# Patient Record
Sex: MALE | Race: WHITE | Employment: FULL TIME | URBAN - METROPOLITAN AREA
[De-identification: names, ages, dates, MRNs, and addresses within clinical notes are randomized per-mention and may not be internally consistent; named-entity substitution may affect disease eponyms.]

---

## 2019-05-24 ENCOUNTER — HOSPITAL ENCOUNTER (EMERGENCY)
Age: 44
Discharge: HOME | End: 2019-05-24
Attending: EMERGENCY MEDICINE

## 2019-05-24 VITALS
HEART RATE: 82 BPM | WEIGHT: 300 LBS | DIASTOLIC BLOOD PRESSURE: 82 MMHG | RESPIRATION RATE: 16 BRPM | TEMPERATURE: 98.2 F | OXYGEN SATURATION: 95 % | HEIGHT: 74 IN | SYSTOLIC BLOOD PRESSURE: 128 MMHG | BODY MASS INDEX: 38.5 KG/M2

## 2019-05-24 DIAGNOSIS — T40.411A ACCIDENTAL FENTANYL OVERDOSE, INITIAL ENCOUNTER (HCC): ICD-10-CM

## 2019-05-24 DIAGNOSIS — F11.10 OPIOID ABUSE (HCC): Primary | ICD-10-CM

## 2019-05-24 PROCEDURE — 99283 EMERGENCY DEPT VISIT LOW MDM: CPT

## 2019-05-24 RX ORDER — NALOXONE HYDROCHLORIDE 1 MG/ML
0.4 INJECTION INTRAMUSCULAR; INTRAVENOUS; SUBCUTANEOUS PRN
Status: DISCONTINUED | OUTPATIENT
Start: 2019-05-24 | End: 2019-05-25 | Stop reason: HOSPADM

## 2019-05-24 NOTE — ED PROVIDER NOTES
EMERGENCY DEPARTMENT ENCOUNTER    Pt Name: Tal Florentino  MRN: [id number]  [place] 1975  Date of evaluation: 5/24/19  CHIEF COMPLAINT       Chief Complaint   Patient presents with    Addiction Problem     Daily Heroin use    Withdrawal     Heroin     HISTORY OF PRESENT ILLNESS   HPI   The patient is a 78-year-old white male who was brought to the emergency room status post his children calling 911 status post the patient snorting fentanyl. Patient states he used between a half a gram, and a gram.  The patient states he uses heroin on a daily basis. A short was brought into the emergency room along with his wife. The wife reportedly told her attending physician that she was withdrawing. The  states he was snorting the drug. The patient is not suicidal, or homicidal.  The patient is uncooperative, but not violent. He denies any other drug use at this time. REVIEW OF SYSTEMS     Review of Systems   The patient is uncooperative in a review of systems cannot be performed. PASTMEDICAL HISTORY     Past Medical History:   Diagnosis Date    Hepatitis C     PTSD (post-traumatic stress disorder)     Snores      SURGICAL HISTORY       Past Surgical History:   Procedure Laterality Date    INGUINAL HERNIA REPAIR Left     LEG SURGERY Left     compartment syndrome left leg     CURRENT MEDICATIONS       Previous Medications    CLONAZEPAM (KLONOPIN) 1 MG TABLET    Take 1 mg by mouth 2 times daily    IBUPROFEN (ADVIL;MOTRIN) 800 MG TABLET    Take 1 tablet by mouth every 8 hours as needed for Pain. NEOMYCIN-POLYMYXIN-DEXAMETH 0.1 % OINT    Apply 0.5 inches to eye 4 times daily    OXYCODONE-ACETAMINOPHEN (PERCOCET) 5-325 MG PER TABLET    Take 1-2 tablets by mouth every 6 hours as needed for Pain    RISPERIDONE (RISPERDAL) 1 MG TABLET    Take 1 mg by mouth nightly    SERTRALINE (ZOLOFT) 25 MG TABLET    Take 25 mg by mouth daily     ALLERGIES     is allergic to prednisone.   FAMILY HISTORY     has no family status information on file. SOCIAL HISTORY       Social History     Tobacco Use    Smoking status: Former Smoker   Substance Use Topics    Alcohol use: No     Alcohol/week: 0.0 oz    Drug use: Yes     Types: Marijuana     Comment: history of heroin, clean 1 years     PHYSICAL EXAM     INITIAL VITALS: /82   Pulse 82   Temp 98.2 °F (36.8 °C) (Oral)   Resp 16   Ht 6' 2\" (1.88 m)   Wt 300 lb (136.1 kg)   SpO2 95%   BMI 38.52 kg/m²    Physical Exam  Gen. well-developed well-nourished white male in no acute distress  Vital signs as per above  Neck supple without JVD thyromegaly or lymphadenopathy  Chest clear to auscultation  Cor regular regular rhythm normal S1 normal S2 without appreciable murmurs or gallops  Abdomen soft positive bowel sounds without organomegaly tenderness or palpable masses  Extremities without clubbing cyanosis or edema  Neuro grossly intact and nonfocal.  MEDICAL DECISION MAKING:   The patient admits to fentanyll ingestion. CRITICAL CARE:   None    PROCEDURES:    Procedures    DIAGNOSTIC RESULTS   EKG:All EKG's are interpreted by the Emergency Department Physician who either signs or Co-signs this chart in the absence of a cardiologist.        RADIOLOGY:All plain film, CT, MRI, and formal ultrasound images (except ED bedside ultrasound) are read by the radiologist, see reports below, unless otherwisenoted in MDM or here. No orders to display     LABS: All lab results were reviewed by myself, and all abnormals are listed below. Labs Reviewed - No data to display    EMERGENCY DEPARTMENTCOURSE:   When told to wake up her we would give him Narcan the patient would become minimally cooperative, see states I will get really sick if you give me Narcan. When the patient is sober enough for discharge he can be sent home.       Vitals:    Vitals:    05/24/19 1903   BP: 128/82   Pulse: 82   Resp: 16   Temp: 98.2 °F (36.8 °C)   TempSrc: Oral   SpO2: 95%   Weight: 300 lb (136.1 kg)   Height: 6' 2\" (1.88 m)       The patient was given the following medications while in the emergency department:  Orders Placed This Encounter   Medications    naloxone (NARCAN) injection 0.4 mg     CONSULTS:  None    FINAL IMPRESSION      1. Opioid abuse ([facility] [address]    2. Accidental fentanyl overdose, initial encounter [facility])          DISPOSITION/PLAN   DISPOSITION Decision To Discharge 05/24/2019 08:47:23 PM      PATIENT REFERRED TO:  No follow-up provider specified.   DISCHARGE MEDICATIONS:  New Prescriptions    No medications on file     Kiran Luu MD  Attending Emergency Physician                    Helder Woodward MD  05/24/19 [id number]

## 2019-05-24 NOTE — ED NOTES
Pt states he was at home with his wife and kids today when he used heroin. Pt states his daughter called 911. Pt states he is a daily heroin user. Pt states he is going through withdrawal now because he hasn't used in about 5.5 hours. Pt is restless but cooperative.       Lupe Logan RN  05/24/19 [id number]

## 2019-05-25 NOTE — ED NOTES
Provisional Diagnosis:    Opioid Use Disorder, Severe,     Psychosocial and Contextual Factors:  Patient is not linked to a mental health agency. Patient is unsure if he has insurance. C-SSRS Summary:   Patient: X  Family:   Agency: X(EPIC)     Present Suicidal Behavior:    Verbal: Patient denies   Attempt: Patient denies      Past Suicidal Behavior:   Verbal:  Patient denies   Attempt: Patient denies    Self-Injurious/Self-Mutilation: Patient denies     Trauma Identified:  Per EPIC: Patient has a history of PTSD    Protective Factors: Patient reports that he is employed. Risk Factors: Patient as severe substance use disorder. Substance Abuse: Patient reports that he snorts ½ gram of Fentanyl daily    Clinical Summary:  Patient is a 64-year-old  male who came to the [facility] on voluntary status by ambulance. Patient reports that he is here because I could not find anything to get better.  Patient reports that his 12year-old child called the ambulance because he was withdrawing. Patient denies visual and auditory hallucinations. Patient denies suicidal ideation. Patient denies homicidal ideation. Level of Care Disposition: Writer consult with Dr. Camille Suarez. Patient to be discharged with outpatient and inpatient area resources for substance use concerns.

## 2021-09-06 ENCOUNTER — HOSPITAL ENCOUNTER (EMERGENCY)
Age: 46
Discharge: LEFT BEFORE DISCHARGE | End: 2021-09-06
Attending: EMERGENCY MEDICINE

## 2021-09-06 VITALS
TEMPERATURE: 98 F | HEART RATE: 69 BPM | WEIGHT: 275 LBS | DIASTOLIC BLOOD PRESSURE: 83 MMHG | HEIGHT: 74 IN | RESPIRATION RATE: 20 BRPM | SYSTOLIC BLOOD PRESSURE: 123 MMHG | BODY MASS INDEX: 35.29 KG/M2 | OXYGEN SATURATION: 97 %

## 2021-09-06 DIAGNOSIS — R21 RASH: Primary | ICD-10-CM

## 2021-09-06 PROCEDURE — 99283 EMERGENCY DEPT VISIT LOW MDM: CPT

## 2021-09-06 RX ORDER — DIPHENHYDRAMINE HCL 25 MG
25 TABLET ORAL ONCE
Status: DISCONTINUED | OUTPATIENT
Start: 2021-09-06 | End: 2021-09-07 | Stop reason: HOSPADM

## 2021-09-06 ASSESSMENT — PAIN DESCRIPTION - DESCRIPTORS
DESCRIPTORS: PINS AND NEEDLES
DESCRIPTORS: ACHING;ITCHING

## 2021-09-06 ASSESSMENT — ENCOUNTER SYMPTOMS
NAUSEA: 0
SORE THROAT: 0
SHORTNESS OF BREATH: 0
COUGH: 0
RHINORRHEA: 0
ABDOMINAL PAIN: 0
CONSTIPATION: 0
DIARRHEA: 0
VOMITING: 0

## 2021-09-06 ASSESSMENT — PAIN SCALES - GENERAL
PAINLEVEL_OUTOF10: 10
PAINLEVEL_OUTOF10: 7

## 2021-09-06 ASSESSMENT — PAIN DESCRIPTION - FREQUENCY
FREQUENCY: CONTINUOUS
FREQUENCY: CONTINUOUS

## 2021-09-06 ASSESSMENT — PAIN DESCRIPTION - LOCATION: LOCATION: GENERALIZED

## 2021-09-07 NOTE — ED PROVIDER NOTES
[address]  ED  Emergency Department Encounter  EmergencyMedicine Resident     Pt Name:Demetrius Pfeiffer  MRN: [id number]  [place] 1975  Date of evaluation: 9/6/21  PCP:  No primary care provider on file. This patient was evaluated in the Emergency Department for symptoms described in the history of present illness. The patient was evaluated in the context of the global COVID-19 pandemic, which necessitated consideration that the patient might be at risk for infection with the SARS-CoV-2 virus that causes COVID-19. Institutional protocols and algorithms that pertain to the evaluation of patients at risk for COVID-19 are in a state of rapid change based on information released by regulatory bodies including the CDC and federal and state organizations. These policies and algorithms were followed during the patient's care in the ED. CHIEF COMPLAINT       Chief Complaint   Patient presents with    Rash     pt states that a month ago he came incontact with something while working on a [address] and has this problem with itching of skin causing sores       HISTORY OF PRESENT ILLNESS  (Location/Symptom, Timing/Onset, Context/Setting, Quality, Duration, Modifying Factors, Severity.)      Ty Palumbo is a 50 y.o. male who presents with rash. Patient presents to the emergency department with months of rash that is pruritic, involving bilateral upper and lower extremities, patient reports that bed bugs have been coming out of his skin, his mouth and his anus. Patient reports that the bugs are swimming throughout his body, he is accompanied by his wife who reports that nobody else in the house is affected by these \"bedbugs. \"  Patient denies any fevers, chills, cough, congestion, chest pain, shortness breath, abdominal pain, nausea, vomiting, diarrhea, suicidal ideation, homicidal ideation, hallucinations.   Patient was diagnosed with prurigo nodularis, prescribed medications which helped temporarily. PAST MEDICAL / SURGICAL / SOCIAL / FAMILY HISTORY      has a past medical history of Hepatitis C, PTSD (post-traumatic stress disorder), and Snores. has a past surgical history that includes Inguinal hernia repair (Left) and Leg Surgery (Left). Social History     Socioeconomic History    Marital status: Single     Spouse name: Not on file    Number of children: Not on file    Years of education: Not on file    Highest education level: Not on file   Occupational History    Not on file   Tobacco Use    Smoking status: Former Smoker    Smokeless tobacco: Current User   Substance and Sexual Activity    Alcohol use: No     Alcohol/week: 0.0 standard drinks    Drug use: Yes     Types: Marijuana     Comment: history of heroin, clean 1 years    Sexual activity: Not on file   Other Topics Concern    Not on file   Social History Narrative    Not on file     Social Determinants of Health     Financial Resource Strain:     Difficulty of Paying Living Expenses:    Food Insecurity:     Worried About Running Out of Food in the Last Year:     [address] in the Last Year:    Transportation Needs:     Lack of Transportation (Medical):      Lack of Transportation (Non-Medical):    Physical Activity:     Days of Exercise per Week:     Minutes of Exercise per Session:    Stress:     Feeling of Stress :    Social Connections:     Frequency of Communication with Friends and Family:     Frequency of Social Gatherings with Friends and Family:     Attends [employer] Services:     Active Member of Clubs or Organizations:     Attends Club or Organization Meetings:     Marital Status:    Intimate Partner Violence:     Fear of Current or Ex-Partner:     Emotionally Abused:     Physically Abused:     Sexually Abused:        Family History   Problem Relation Age of Onset    Cancer Other     Diabetes Other     Substance Abuse Other        Allergies: Prednisone    Home Medications:  Prior to Admission medications    Medication Sig Start Date End Date Taking? Authorizing Provider   clonazePAM (KLONOPIN) 1 MG tablet Take 1 mg by mouth 2 times daily    Historical Provider, MD   sertraline (ZOLOFT) 25 MG tablet Take 25 mg by mouth daily    Historical Provider, MD   risperiDONE (RISPERDAL) 1 MG tablet Take 1 mg by mouth nightly    Historical Provider, MD   Neomycin-Polymyxin-Dexameth 0.1 % OINT Apply 0.5 inches to eye 4 times daily 8/14/15   Ailyn Chamberlain MD   oxyCODONE-acetaminophen (PERCOCET) 5-325 MG per tablet Take 1-2 tablets by mouth every 6 hours as needed for Pain 8/14/15   Ailyn Chamberlain MD   ibuprofen (ADVIL;MOTRIN) 800 MG tablet Take 1 tablet by mouth every 8 hours as needed for Pain. 5/30/14   Dante Rider,        REVIEW OF SYSTEMS    (2-9 systems for level 4, 10 or more for level 5)      Review of Systems   Constitutional: Negative for chills, diaphoresis, fatigue and fever. HENT: Negative for congestion, rhinorrhea and sore throat. Eyes: Negative for visual disturbance. Respiratory: Negative for cough and shortness of breath. Cardiovascular: Negative for chest pain. Gastrointestinal: Negative for abdominal pain, constipation, diarrhea, nausea and vomiting. Genitourinary: Negative for dysuria and hematuria. Musculoskeletal: Negative for neck pain and neck stiffness. Skin: Positive for rash. Negative for pallor. Neurological: Negative for dizziness, syncope, weakness, light-headedness and headaches. Psychiatric/Behavioral: Negative for confusion, dysphoric mood, hallucinations, self-injury and suicidal ideas. PHYSICAL EXAM   (up to 7 for level 4, 8 or more for level 5)      INITIAL VITALS:   /83   Pulse 69   Temp 98 °F (36.7 °C) (Infrared)   Resp 20   Ht 6' 2\" (1.88 m)   Wt 275 lb (124.7 kg)   SpO2 97%   BMI 35.31 kg/m²     Physical Exam  Constitutional:       General: He is not in acute distress. Appearance: He is well-developed. He is not toxic-appearing or diaphoretic. Comments: Patient is alert and oriented, flight of ideas, pressured speech, delusions, no acute distress   HENT:      Head: Normocephalic and atraumatic. Right Ear: External ear normal.      Left Ear: External ear normal.   Eyes:      General:         Right eye: No discharge. Left eye: No discharge. Extraocular Movements: Extraocular movements intact. Pupils: Pupils are equal, round, and reactive to light. Neck:      Vascular: No JVD. Trachea: No tracheal deviation. Cardiovascular:      Rate and Rhythm: Normal rate and regular rhythm. Heart sounds: Normal heart sounds. No murmur heard. No friction rub. No gallop. Pulmonary:      Effort: Pulmonary effort is normal. No respiratory distress. Breath sounds: Normal breath sounds. No stridor. No wheezing, rhonchi or rales. Chest:      Chest wall: No tenderness. Abdominal:      General: There is no distension. Palpations: Abdomen is soft. There is no mass. Tenderness: There is no abdominal tenderness. There is no right CVA tenderness, left CVA tenderness or guarding. Musculoskeletal:         General: Normal range of motion. Cervical back: Normal range of motion and neck supple. Skin:     General: Skin is warm. Capillary Refill: Capillary refill takes less than 2 seconds. Findings: Rash present. Comments: Patient has erythematous macules throughout bilateral upper and lower extremities, many picked until open wounds are showing, no evidence of infection. Neurological:      Mental Status: He is alert and oriented to person, place, and time.    Psychiatric:      Comments: Patient is having pressured speech, flight of ideas, delusions, no thought blocking, not responding to internal stimuli, denying any hallucinations, SI, HI         DIFFERENTIAL  DIAGNOSIS     PLAN (LABS / IMAGING / EKG):  No orders of the defined types were placed in this encounter. MEDICATIONS ORDERED:  Orders Placed This Encounter   Medications    diphenhydrAMINE (BENADRYL) tablet 25 mg       DDX:     DIAGNOSTIC RESULTS / EMERGENCY DEPARTMENT COURSE / MDM   LAB RESULTS:  No results found for this visit on 09/06/21. IMPRESSION: 75-year-old male presenting to the emergency department with delusions of bugs coming out of the skin, showing a bag of crumbs that he reports were all bugs and were actively crawling out of his skin. Concern for psychiatric illness, will have social work evaluate, provide resources. Patient is accompanied by his wife, recommend follow-up with psychiatry for further management. Patient does have rash, concerning for dermatitis versus scabies, recommend Benadryl, permethrin, patient does have allergy to prednisone. Also recommend follow-up with dermatology for further management. RADIOLOGY:      EKG      All EKG's are interpreted by the Emergency Department Physician who either signs or Co-signs this chart in the absence of a cardiologist.    EMERGENCY DEPARTMENT COURSE:  Patient came to emergency department, HPI and physical exam were conducted. All nursing notes were reviewed. Gave recommendations, patient was not happy with assessment, left the emergency [address] The patient is clinically not intoxicated, free from distracting pain, appears to have intact insight, judgment and reason and in my medical opinion has the capacity to make decisions despite delusions. The patient is also not under any duress to leave the hospital. In this scenario, it would be battery to subject a patient to treatment against his/her will. I have voiced my concerns for the patient's health given that a full evaluation and treatment had not occurred. I have discussed the need for continued evaluation to determine if their symptoms are caused by a condition that present risk of death or morbidity.  Risks including but not limited to death, permanent disability, prolonged hospitalization, prolonged illness, were discussed. I tried offering alternative options in hopes that the patient might be amenable to partial evaluation and treatment which would be medically beneficial to the patient, though the patient declined my options and insisted on leaving. Because I have been unable to convince the patient to stay, I answered all of their questions about their condition and asked them to return to the ED as soon as possible to complete their evaluation, especially if their symptoms worsen or do not improve. I emphasized that leaving against medical advice does not preclude returning here for further evaluation. I asked the patient to return if they change their mind about the further evaluation and treatment. I strongly encouraged the patient to return to this Emergency Department or any Emergency Department at any time, particularly with worsening symptoms. PROCEDURES:      CONSULTS:  None    CRITICAL CARE:      FINAL IMPRESSION      1. Rash          DISPOSITION / PLAN     DISPOSITION [place] 2021 09:03:27 PM      PATIENT REFERRED TO:  No follow-up provider specified.     DISCHARGE MEDICATIONS:  Discharge Medication List as of 2021 10:07 PM          Inocente Michael MD  Emergency Medicine Resident    (Please note that portions of thisnote were completed with a voice recognition program.  Efforts were made to edit the dictations but occasionally words are mis-transcribed.)        Inocente Michael MD  Resident  [address] Patrice Apgar, MD  Resident  [phone]

## 2021-09-07 NOTE — ED PROVIDER NOTES
[facility]     Emergency Department     Faculty Attestation    I performed a history and physical examination of the patient and discussed management with the resident. I reviewed the resident´s note and agree with the documented findings and plan of care. Any areas of disagreement are noted on the chart. I was personally present for the key portions of any procedures. I have documented in the chart those procedures where I was not present during the key portions. I have reviewed the emergency nurses triage note. I agree with the chief complaint, past medical history, past surgical history, allergies, medications, social and family history as documented unless otherwise noted below. For Physician Assistant/ Nurse Practitioner cases/documentation I have personally evaluated this patient and have completed at least one if not all key elements of the E/M (history, physical exam, and MDM). Additional findings are as noted. Multiple noninfected nodules, patient previously was given a diagnosis of prurigo nodularis.      Jennifer Worthington MD  09/06/21 2039

## 2021-09-07 NOTE — ED NOTES
Pt arrives to ED with wife. Pt states he has been having bugs bite hime for the past month and has sores all over. Pt states the bugs are inside of him. Pt seems to be having a psych related issue. Pt does not have any visible insects on him. Pt denies having previous psych issues and denies this. Pt able to answer all questions. Wife at bedside.      Char Zimmerman RN  09/06/21 2040

## 2021-10-02 ENCOUNTER — HOSPITAL ENCOUNTER (EMERGENCY)
Age: 46
Discharge: HOME | End: 2021-10-02
Attending: EMERGENCY MEDICINE

## 2021-10-02 VITALS
DIASTOLIC BLOOD PRESSURE: 96 MMHG | RESPIRATION RATE: 18 BRPM | OXYGEN SATURATION: 96 % | HEART RATE: 95 BPM | TEMPERATURE: 98.5 F | SYSTOLIC BLOOD PRESSURE: 140 MMHG

## 2021-10-02 DIAGNOSIS — H10.32 ACUTE CONJUNCTIVITIS OF LEFT EYE, UNSPECIFIED ACUTE CONJUNCTIVITIS TYPE: ICD-10-CM

## 2021-10-02 DIAGNOSIS — R21 RASH AND OTHER NONSPECIFIC SKIN ERUPTION: Primary | ICD-10-CM

## 2021-10-02 PROCEDURE — 6370000000 HC RX 637 (ALT 250 FOR IP): Performed by: EMERGENCY MEDICINE

## 2021-10-02 PROCEDURE — 99284 EMERGENCY DEPT VISIT MOD MDM: CPT

## 2021-10-02 RX ORDER — SULFACETAMIDE SODIUM 100 MG/ML
2 SOLUTION/ DROPS OPHTHALMIC EVERY 4 HOURS
Qty: 5 ML | Refills: 0 | Status: SHIPPED | OUTPATIENT
Start: 2021-10-02 | End: 2021-10-10

## 2021-10-02 RX ORDER — LISINOPRIL 20 MG/1
20 TABLET ORAL DAILY
COMMUNITY

## 2021-10-02 RX ORDER — BUPRENORPHINE AND NALOXONE 8; 2 MG/1; MG/1
FILM, SOLUBLE BUCCAL; SUBLINGUAL
COMMUNITY

## 2021-10-02 RX ORDER — DOXYCYCLINE 100 MG/1
100 CAPSULE ORAL ONCE
Status: COMPLETED | OUTPATIENT
Start: 2021-10-02 | End: 2021-10-02

## 2021-10-02 RX ORDER — SULFACETAMIDE SODIUM 100 MG/ML
2 SOLUTION/ DROPS OPHTHALMIC
Status: DISCONTINUED | OUTPATIENT
Start: 2021-10-02 | End: 2021-10-02 | Stop reason: HOSPADM

## 2021-10-02 RX ORDER — DOXYCYCLINE HYCLATE 100 MG
100 TABLET ORAL 2 TIMES DAILY
Qty: 28 TABLET | Refills: 0 | Status: SHIPPED | OUTPATIENT
Start: 2021-10-02 | End: 2021-10-16

## 2021-10-02 RX ADMIN — DOXYCYCLINE 100 MG: 100 CAPSULE ORAL at 05:04

## 2021-10-02 RX ADMIN — SULFACETAMIDE SODIUM 2 DROP: 100 SOLUTION/ DROPS OPHTHALMIC at 05:04

## 2021-10-02 ASSESSMENT — ENCOUNTER SYMPTOMS
COUGH: 0
SHORTNESS OF BREATH: 0
DIARRHEA: 0
EYE DISCHARGE: 1
EYE ITCHING: 0
VOMITING: 0
EYE PAIN: 1
EYE REDNESS: 1
ABDOMINAL PAIN: 0
CONSTIPATION: 0
NAUSEA: 0

## 2021-10-02 NOTE — ED NOTES
Discharge papers reviewed with pt. Pt educated on medication and dosages. Pt instructed to follow-up with PCP/specialist and to return to ED if symptoms worsen or have any concerns. Pt verbalizes understanding. Pt ambulated out of ED with steady gait and all belongings.        Tim Hodgkins, RN  10/02/21 1254

## 2021-10-02 NOTE — ED TRIAGE NOTES
Pt presents to ED for multiple concerns. Pt states that around 2 hours ago his left eye started to feel like there is something in it. Pt also states that he has sores on his arms, legs and face that keep appearing. Pt states that the sores have some purulent drainage. Pt denies any fevers/ chills.

## 2021-10-02 NOTE — ED PROVIDER NOTES
[address]  eMERGENCY dEPARTMENT eNCOUnter      Pt Name: Kenzie Phelps  MRN: [id number]  [place] 1975  Date of evaluation: 10/2/21      CHIEF COMPLAINT       Chief Complaint   Patient presents with    Foreign Body in Eye     left eye         HISTORY OF PRESENT ILLNESS    Kenzie Phelps is a 48 y.o. male who presents complaining of eye pain. Patient is here stating that earlier today he started noticing pain in his left eye. Patient states is having some discharge come out of it. Patient states it is painful. Patient is also complaining of sores that been on his body for months now. Patient states has been to the hospital couple times and they have not really helped him. Patient has been on antibiotics and states they seem to get better but then they come back. Patient mikie wise has a bunch of chronic issues that he is not been able to get see his doctor for because he is in between insurances. REVIEW OF SYSTEMS       Review of Systems   Constitutional: Negative for chills and fever. Eyes: Positive for pain, discharge and redness. Negative for itching. Respiratory: Negative for cough and shortness of breath. Cardiovascular: Negative for chest pain and palpitations. Gastrointestinal: Negative for abdominal pain, constipation, diarrhea, nausea and vomiting. Skin: Positive for rash. Neurological: Negative for dizziness, seizures and headaches.        PAST MEDICAL HISTORY     Past Medical History:   Diagnosis Date    Hepatitis C     PTSD (post-traumatic stress disorder)     Snores        SURGICAL HISTORY       Past Surgical History:   Procedure Laterality Date    INGUINAL HERNIA REPAIR Left     LEG SURGERY Left     compartment syndrome left leg       CURRENT MEDICATIONS       Previous Medications    BUPRENORPHINE-NALOXONE (SUBOXONE) 8-2 MG FILM SL FILM    Suboxone 8 mg-2 mg sublingual film    CLONAZEPAM (KLONOPIN) 1 MG TABLET    Take 1 mg by mouth 2 times daily IBUPROFEN (ADVIL;MOTRIN) 800 MG TABLET    Take 1 tablet by mouth every 8 hours as needed for Pain. LISINOPRIL (PRINIVIL;ZESTRIL) 20 MG TABLET    Take 20 mg by mouth daily    NEOMYCIN-POLYMYXIN-DEXAMETH 0.1 % OINT    Apply 0.5 inches to eye 4 times daily    OXYCODONE-ACETAMINOPHEN (PERCOCET) 5-325 MG PER TABLET    Take 1-2 tablets by mouth every 6 hours as needed for Pain    RISPERIDONE (RISPERDAL) 1 MG TABLET    Take 1 mg by mouth nightly    SERTRALINE (ZOLOFT) 25 MG TABLET    Take 25 mg by mouth daily       ALLERGIES     is allergic to prednisone. SOCIAL HISTORY      reports that he has quit smoking. He uses smokeless tobacco. He reports current drug use. Drugs: Marijuana and Cocaine. He reports that he does not drink alcohol. PHYSICAL EXAM     INITIAL VITALS: BP (!) 140/96   Pulse 95   Temp 98.5 °F (36.9 °C) (Oral)   Resp 18   SpO2 96%      Physical Exam  Vitals and nursing note reviewed. Constitutional:       General: He is not in acute distress. Appearance: He is well-developed. He is not diaphoretic. HENT:      Head: Normocephalic and atraumatic. Eyes:      General: No scleral icterus. Right eye: No discharge. Left eye: No discharge. Conjunctiva/sclera:      Left eye: Left conjunctiva is injected. No exudate. Pupils: Pupils are equal, round, and reactive to light. Pulmonary:      Effort: Pulmonary effort is normal. No respiratory distress. Skin:     General: Skin is warm and dry. Coloration: Skin is not pale. Findings: Rash (Multiple open sores throughout his body but no surrounding erythema or discharge) present. No erythema. Neurological:      Mental Status: He is alert and oriented to person, place, and time. Psychiatric:         Behavior: Behavior normal.         Thought Content:  Thought content normal.         Judgment: Judgment normal.         DIAGNOSTIC RESULTS     RADIOLOGY:All plain film, CT,MRI, and formal ultrasound images (except ED bedside ultrasound) are read by the radiologist and the interpretations are directly viewed by the emergency physician. LABS: All lab results were reviewed by myself, and all abnormals are listed below. Labs Reviewed - No data to display      MEDICAL DECISION MAKING:     The rash looks like possible MRSA so put him on doxycycline for 2 weeks. I looks like conjunctivitis. We will discharge him with some follow-up. EMERGENCY DEPARTMENT COURSE:   Vitals:    Vitals:    10/02/21 0441   BP: (!) 140/96   Pulse: 95   Resp: 18   Temp: 98.5 °F (36.9 °C)   TempSrc: Oral   SpO2: 96%       The patient was given the following medications while in the emergency department:  Orders Placed This Encounter   Medications    sulfacetamide (BLEPH-10) 10 % ophthalmic solution 2 drop    doxycycline monohydrate (MONODOX) capsule 100 mg    sulfacetamide (BLEPH-10) 10 % ophthalmic solution     Sig: Place 2 drops into the left eye every 4 hours for 8 days     Dispense:  5 mL     Refill:  0    doxycycline hyclate (VIBRA-TABS) 100 MG tablet     Sig: Take 1 tablet by mouth 2 times daily for 14 days     Dispense:  28 tablet     Refill:  0       -------------------------      CONSULTS:  None    PROCEDURES:  None    FINAL IMPRESSION      1. Rash and other nonspecific skin eruption    2.  Acute conjunctivitis of left eye, unspecified acute conjunctivitis type          DISPOSITION/PLAN   DISPOSITION Decision To Discharge 10/02/2021 04:54:12 AM      PATIENT REFERREDTO:  [facility]  [facility] [address]  [address] [place] [address]  [phone]    If symptoms worsen    Flavia Dover MD  [address] [place] [id number]  [phone]    In 1 week      Willis Poe MD  [address]  [phone]    In 1 week        DISCHARGEMEDICATIONS:  New Prescriptions    DOXYCYCLINE HYCLATE (VIBRA-TABS) 100 MG TABLET    Take 1 tablet by mouth 2 times daily for 14 days    SULFACETAMIDE (BLEPH-10) 10 % OPHTHALMIC SOLUTION    Place 2 drops into the left eye every 4 hours for 8 days       (Please note that portions of this note were completed with a voice recognition program.  Efforts were made to edit thedictations but occasionally words are mis-transcribed.)    Missael Strickland MD  Attending Emergency Physician                        Missael Strickland MD  10/02/21 5942

## 2021-11-16 ENCOUNTER — HOSPITAL ENCOUNTER (EMERGENCY)
Age: 46
Discharge: LEFT BEFORE DISCHARGE | End: 2021-11-16
Attending: EMERGENCY MEDICINE

## 2021-11-16 VITALS
HEIGHT: 74 IN | DIASTOLIC BLOOD PRESSURE: 92 MMHG | HEART RATE: 68 BPM | TEMPERATURE: 97 F | WEIGHT: 265 LBS | BODY MASS INDEX: 34.01 KG/M2 | SYSTOLIC BLOOD PRESSURE: 126 MMHG | OXYGEN SATURATION: 97 % | RESPIRATION RATE: 20 BRPM

## 2021-11-29 ENCOUNTER — HOSPITAL ENCOUNTER (EMERGENCY)
Age: 46
Discharge: HOME | End: 2021-11-30
Attending: EMERGENCY MEDICINE
Payer: COMMERCIAL

## 2021-11-29 DIAGNOSIS — F22 DELUSIONS OF PARASITOSIS (HCC): Primary | ICD-10-CM

## 2021-11-29 PROCEDURE — 99284 EMERGENCY DEPT VISIT MOD MDM: CPT

## 2021-11-29 ASSESSMENT — PAIN SCALES - GENERAL: PAINLEVEL_OUTOF10: 10

## 2021-11-29 ASSESSMENT — PAIN DESCRIPTION - DESCRIPTORS: DESCRIPTORS: ACHING;BURNING;CONSTANT

## 2021-11-29 ASSESSMENT — PAIN DESCRIPTION - LOCATION: LOCATION: PENIS

## 2021-11-29 ASSESSMENT — PAIN DESCRIPTION - FREQUENCY: FREQUENCY: CONTINUOUS

## 2021-11-29 ASSESSMENT — PAIN DESCRIPTION - PAIN TYPE: TYPE: ACUTE PAIN

## 2021-11-30 VITALS
HEART RATE: 77 BPM | TEMPERATURE: 98.1 F | HEIGHT: 74 IN | BODY MASS INDEX: 34.01 KG/M2 | SYSTOLIC BLOOD PRESSURE: 140 MMHG | DIASTOLIC BLOOD PRESSURE: 89 MMHG | OXYGEN SATURATION: 98 % | WEIGHT: 265 LBS | RESPIRATION RATE: 17 BRPM

## 2021-11-30 PROCEDURE — 6370000000 HC RX 637 (ALT 250 FOR IP): Performed by: STUDENT IN AN ORGANIZED HEALTH CARE EDUCATION/TRAINING PROGRAM

## 2021-11-30 RX ORDER — SKIN PROTECTANT 44 G/100G
OINTMENT TOPICAL ONCE
Status: COMPLETED | OUTPATIENT
Start: 2021-11-30 | End: 2021-11-30

## 2021-11-30 RX ADMIN — SKIN PROTECTANT: 44 OINTMENT TOPICAL at 00:53

## 2021-11-30 NOTE — ED TRIAGE NOTES
Pt complains of \"hydrogen breathing bugs\" that live under his skin and come out at night \"driving him crazy\". Pt states they are building up at the tip of his penis and falling off into the toilet. Pt states it burns when he urinates and he tries to rip them out with tweezers. He sees them in his Vermell Modest stool as well. Pt states that he coughs them out when they melt and was diagnosed with bronchitis last week. Pt states he no longer has a family doctor and only a suboxone doctor. Pt would like a work note for the bugs.

## 2021-11-30 NOTE — ED PROVIDER NOTES
[address]  Emergency Department Encounter  EmergencyMedicine Resident     Pt Name:Demetrius Farris  MRN: [id number]  [place] 1975  Date of evaluation: 11/30/21  PCP:  No primary care provider on file. CHIEF COMPLAINT       Chief Complaint   Patient presents with    Insect Bite     Hydrogen Breathing Parasites        HISTORY OF PRESENT ILLNESS  (Location/Symptom, Timing/Onset, Context/Setting, Quality, Duration, Modifying Factors, Severity.)      Di Almaraz is a 48 y.o. male who presents with chief complaint hydrated breathing parasites coming out of the lesions on the skin of mouth ears nose penis anus. States this has been going on for over 3 months seen many doctors but nobody believes him. Also reports cocaine, meth, \"stimulant\" use. States that he has a good job now can afford these things. Has been putting on lotions. States that he scratches and pulls these 4 inch long white parasites with antenna through the skin and fingertips. They disappear turn to smoke before any malignancy none. PAST MEDICAL / SURGICAL / SOCIAL / FAMILY HISTORY      has a past medical history of Depression, Hepatitis C, PTSD (post-traumatic stress disorder), and Snores. has a past surgical history that includes Inguinal hernia repair (Left) and Leg Surgery (Left).     Social History     Socioeconomic History    Marital status: Single     Spouse name: Not on file    Number of children: Not on file    Years of education: Not on file    Highest education level: Not on file   Occupational History    Not on file   Tobacco Use    Smoking status: Former Smoker    Smokeless tobacco: Current User   Substance and Sexual Activity    Alcohol use: No     Alcohol/week: 0.0 standard drinks    Drug use: Yes     Types: Marijuana (Jetty [place]), Cocaine     Comment: \"anything I can get\"    Sexual activity: Not on file   Other Topics Concern    Not on file   Social History Narrative    Not on file Social Determinants of Health     Financial Resource Strain:     Difficulty of Paying Living Expenses: Not on file   Food Insecurity:     Worried About Running Out of Food in the Last Year: Not on file    Desire of Food in the Last Year: Not on file   Transportation Needs:     Lack of Transportation (Medical): Not on file    Lack of Transportation (Non-Medical): Not on file   Physical Activity:     Days of Exercise per Week: Not on file    Minutes of Exercise per Session: Not on file   Stress:     Feeling of Stress : Not on file   Social Connections:     Frequency of Communication with Friends and Family: Not on file    Frequency of Social Gatherings with Friends and Family: Not on file    Attends Jehovah's witness Services: Not on file    Active Member of [address] [place] [address] [employer] or Organizations: Not on file    Attends Club or Organization Meetings: Not on file    Marital Status: Not on file   Intimate Partner Violence:     Fear of Current or Ex-Partner: Not on file    Emotionally Abused: Not on file    Physically Abused: Not on file    Sexually Abused: Not on file   Housing Stability:     Unable to Pay for Housing in the Last Year: Not on file    Number of [place] in the Last Year: Not on file    Unstable Housing in the Last Year: Not on file       Family History   Problem Relation Age of Onset    Cancer Other     Diabetes Other     Substance Abuse Other        Allergies:  Prednisone    Home Medications:  Prior to Admission medications    Medication Sig Start Date End Date Taking?  Authorizing Provider   lisinopril (PRINIVIL;ZESTRIL) 20 MG tablet Take 20 mg by mouth daily    Historical Provider, MD   buprenorphine-naloxone (SUBOXONE) 8-2 MG FILM SL film Suboxone 8 mg-2 mg sublingual film    Historical Provider, MD   clonazePAM (KLONOPIN) 1 MG tablet Take 1 mg by mouth 2 times daily    Historical Provider, MD   sertraline (ZOLOFT) 25 MG tablet Take 25 mg by mouth daily    Historical Provider, MD risperiDONE (RISPERDAL) 1 MG tablet Take 1 mg by mouth nightly    Historical Provider, MD   Neomycin-Polymyxin-Dexameth 0.1 % OINT Apply 0.5 inches to eye 4 times daily 8/14/15   Raimundo Kitchen MD   oxyCODONE-acetaminophen [facility]) 5-325 MG per tablet Take 1-2 tablets by mouth every 6 hours as needed for Pain 8/14/15   Raimundo Kitchen MD   ibuprofen (ADVIL;MOTRIN) 800 MG tablet Take 1 tablet by mouth every 8 hours as needed for Pain. 5/30/14   Inder Moreau, DO       REVIEW OF SYSTEMS    (2-9 systems for level 4, 10 or more for level 5)      Review of Systems   Constitutional: Negative for fever. HENT: Negative for congestion. Eyes: Negative for photophobia. Respiratory: Negative for shortness of breath. Cardiovascular: Negative for chest pain. Gastrointestinal: Negative for abdominal pain and vomiting. Endocrine: Negative for polyuria. Genitourinary: Negative for dysuria. Musculoskeletal: Negative for arthralgias. Skin: Negative for color change. Allergic/Immunologic: Negative for immunocompromised state. Neurological: Negative for dizziness. Hematological: Does not bruise/bleed easily. Psychiatric/Behavioral: Positive for delusions    PHYSICAL EXAM   (up to 7 for level 4, 8 or more for level 5)      INITIAL VITALS:   BP (!) 139/114   Pulse 107   Temp 98.1 °F (36.7 °C) (Oral)   Resp 20   Ht 6' 2\" (1.88 m)   Wt 265 lb (120.2 kg)   SpO2 97%   BMI 34.02 kg/m²     Physical Exam  Constitutional:       General: Not in acute distress. Appearance: Normal appearance. Normal weight. Not toxic-appearing. HENT:      Head: Normocephalic and atraumatic. Nose: Nose normal.      Mouth/Throat: Mucous membranes are moist.  Uvula midline no oropharyngeal edema. Pharynx: Oropharynx is clear. Eyes:      Extraocular Movements: Extraocular movements intact. Conjunctiva/sclera: Conjunctivae normal.      Pupils: Pupils are equal, round, and reactive to light. Neck:      Musculoskeletal: Normal range of motion and neck supple. No neck rigidity. Cardiovascular:      Rate and Rhythm: Normal rate and regular rhythm. Pulses: Normal pulses. Heart sounds: Normal heart sounds. No murmur. Pulmonary:      Effort: Pulmonary effort is normal.      Breath sounds: Normal breath sounds. No wheezing. Abdominal:      General: Abdomen is flat. Bowel sounds are normal.      Tenderness: There is no abdominal tenderness. Musculoskeletal:     Normal range of motion. General: No swelling or tenderness. No LE edema    Skin:     General: Skin is warm. Capillary Refill: Capillary refill takes less than 2 seconds. Coloration: Skin is not jaundiced. Neurological:      General: No focal deficit present. Mental Status: Alert and oriented to person, place, and time. Mental status is at baseline. Motor: No weakness. DIFFERENTIAL  DIAGNOSIS     PLAN (LABS / IMAGING / EKG):  No orders of the defined types were placed in this encounter. MEDICATIONS ORDERED:  Orders Placed This Encounter   Medications    dermaphor ointment           DIAGNOSTIC RESULTS / EMERGENCY DEPARTMENT COURSE / MDM     LABS:  No results found for this visit on 11/29/21. RADIOLOGY:  None    EKG  None    All EKG's are interpreted by the Emergency Department Physician who either signs or Co-signs this chart in the absence of a cardiologist.    EMERGENCY DEPARTMENT COURSE:  Patient having active delusional parasitosis, skin picking. Numerous lesions on his face chest poorly healing from skin picking, no erythema pus drainage from abscess. Instructed patient to stop using cocaine left and other stimulants. Instructed him to follow-up with his doctor who prescribes his risperidone. States that he takes his risperidone daily.   Became very defensive when I suggested he needs to stop stimulants says that this is not \"meth mites\". PROCEDURES:  None    CONSULTS:  None    CRITICAL CARE:  None    FINAL IMPRESSION      1.  Delusions of parasitosis [facility])          DISPOSITION / PLAN     DISPOSITION Discharge - Pending Orders Complete 11/30/2021 12:16:14 AM      PATIENT REFERRED TO:  [address] [address] [phone]  Schedule an appointment as soon as possible for a visit in 1 week        DISCHARGE MEDICATIONS:  New Prescriptions    No medications on file       Sean Ruiz MD  Emergency Medicine Resident    (Please note that portions of thisnote were completed with a voice recognition program.  Efforts were made to edit the dictations but occasionally words are mis-transcribed.)       Sean Ruiz MD  Resident  11/30/21 8299

## 2021-11-30 NOTE — ED PROVIDER NOTES
[address]  eMERGENCY dEPARTMENT eNCOUnter   Attending Attestation     Pt Name: Jacqueline Dawson  MRN: [id number]  [place] 1975  Date of evaluation: 11/30/21       Jacqueline Dawson is a 48 y.o. male who presents with Insect Bite (Hydrogen Breathing Parasites )      History:   Patient is here stating that he is having bugs coming out of his skin this been going on for a long time and thinks the doctors just do not believe them. Exam: Vitals:   Vitals:    11/29/21 2336   BP: (!) 139/114   Pulse: 107   Resp: 20   Temp: 98.1 °F (36.7 °C)   TempSrc: Oral   SpO2: 97%   Weight: 265 lb (120.2 kg)   Height: 6' 2\" (1.88 m)     No rash noted but patient does have excoriated skin. I performed a history and physical examination of the patient and discussed management with the resident. I reviewed the residents note and agree with the documented findings and plan of care. Any areas of disagreement are noted on the chart. I was personally present for the key portions of any procedures. I have documented in the chart those procedures where I was not present during the key portions. I have personally reviewed all images and agree with the resident's interpretation. I have reviewed the emergency nurses triage note. I agree with the chief complaint, past medical history, past surgical history, allergies, medications, social and family history as documented unless otherwise noted below. Documentation of the HPI, Physical Exam and Medical Decision Making performed by medical students or scribes is based on my personal performance of the HPI, PE and MDM. I personally evaluated and examined the patient in conjunction with the APC and agree with the assessment, treatment plan, and disposition of the patient as recorded by the APC. Additional findings are as noted.     Deysi Edgar MD  Attending Emergency  Physician             Krishna Barbosa MD  11/30/21 9499

## 2022-07-05 ENCOUNTER — APPOINTMENT (OUTPATIENT)
Dept: GENERAL RADIOLOGY | Age: 47
End: 2022-07-05

## 2022-07-05 ENCOUNTER — HOSPITAL ENCOUNTER (EMERGENCY)
Age: 47
Discharge: HOME | End: 2022-07-05
Attending: EMERGENCY MEDICINE

## 2022-07-05 ENCOUNTER — APPOINTMENT (OUTPATIENT)
Dept: CT IMAGING | Age: 47
End: 2022-07-05

## 2022-07-05 VITALS
SYSTOLIC BLOOD PRESSURE: 158 MMHG | RESPIRATION RATE: 20 BRPM | DIASTOLIC BLOOD PRESSURE: 132 MMHG | HEART RATE: 69 BPM | TEMPERATURE: 98.1 F | OXYGEN SATURATION: 100 %

## 2022-07-05 DIAGNOSIS — J40 BRONCHITIS: Primary | ICD-10-CM

## 2022-07-05 LAB
ABSOLUTE EOS #: 0.5 K/UL (ref 0–0.4)
ABSOLUTE LYMPH #: 2.4 K/UL (ref 1–4.8)
ABSOLUTE MONO #: 0.6 K/UL (ref 0.1–1.3)
ALBUMIN SERPL-MCNC: 4.1 G/DL (ref 3.5–5.2)
ALP BLD-CCNC: 64 U/L (ref 40–129)
ALT SERPL-CCNC: 52 U/L (ref 5–41)
ANION GAP SERPL CALCULATED.3IONS-SCNC: 9 MMOL/L (ref 9–17)
AST SERPL-CCNC: 50 U/L
BASOPHILS # BLD: 1 % (ref 0–2)
BASOPHILS ABSOLUTE: 0 K/UL (ref 0–0.2)
BILIRUB SERPL-MCNC: 0.39 MG/DL (ref 0.3–1.2)
BUN BLDV-MCNC: 17 MG/DL (ref 6–20)
CALCIUM SERPL-MCNC: 9.5 MG/DL (ref 8.6–10.4)
CHLORIDE BLD-SCNC: 102 MMOL/L (ref 98–107)
CO2: 29 MMOL/L (ref 20–31)
CREAT SERPL-MCNC: 0.83 MG/DL (ref 0.7–1.2)
D-DIMER QUANTITATIVE: 0.83 MG/L FEU (ref 0–0.59)
EOSINOPHILS RELATIVE PERCENT: 9 % (ref 0–4)
GFR AFRICAN AMERICAN: >60 ML/MIN
GFR NON-AFRICAN AMERICAN: >60 ML/MIN
GFR SERPL CREATININE-BSD FRML MDRD: ABNORMAL ML/MIN/{1.73_M2}
GLUCOSE BLD-MCNC: 113 MG/DL (ref 70–99)
HCT VFR BLD CALC: 47.3 % (ref 41–53)
HEMOGLOBIN: 15.6 G/DL (ref 13.5–17.5)
INFLUENZA A: NOT DETECTED
INFLUENZA B: NOT DETECTED
LYMPHOCYTES # BLD: 40 % (ref 24–44)
MCH RBC QN AUTO: 27.4 PG (ref 26–34)
MCHC RBC AUTO-ENTMCNC: 33 G/DL (ref 31–37)
MCV RBC AUTO: 83 FL (ref 80–100)
MONOCYTES # BLD: 9 % (ref 1–7)
PDW BLD-RTO: 12.8 % (ref 11.5–14.9)
PLATELET # BLD: 234 K/UL (ref 150–450)
PMV BLD AUTO: 7.7 FL (ref 6–12)
POTASSIUM SERPL-SCNC: 4 MMOL/L (ref 3.7–5.3)
PRO-BNP: 79 PG/ML
RBC # BLD: 5.71 M/UL (ref 4.5–5.9)
SARS-COV-2 RNA, RT PCR: NOT DETECTED
SEG NEUTROPHILS: 41 % (ref 36–66)
SEGMENTED NEUTROPHILS ABSOLUTE COUNT: 2.6 K/UL (ref 1.3–9.1)
SODIUM BLD-SCNC: 140 MMOL/L (ref 135–144)
SOURCE: NORMAL
SPECIMEN DESCRIPTION: NORMAL
TOTAL PROTEIN: 7.5 G/DL (ref 6.4–8.3)
TROPONIN, HIGH SENSITIVITY: 7 NG/L (ref 0–22)
TROPONIN, HIGH SENSITIVITY: <6 NG/L (ref 0–22)
WBC # BLD: 6.1 K/UL (ref 3.5–11)

## 2022-07-05 PROCEDURE — 83880 ASSAY OF NATRIURETIC PEPTIDE: CPT

## 2022-07-05 PROCEDURE — 94760 N-INVAS EAR/PLS OXIMETRY 1: CPT

## 2022-07-05 PROCEDURE — 6370000000 HC RX 637 (ALT 250 FOR IP): Performed by: EMERGENCY MEDICINE

## 2022-07-05 PROCEDURE — 71046 X-RAY EXAM CHEST 2 VIEWS: CPT

## 2022-07-05 PROCEDURE — 85025 COMPLETE CBC W/AUTO DIFF WBC: CPT

## 2022-07-05 PROCEDURE — 84484 ASSAY OF TROPONIN QUANT: CPT

## 2022-07-05 PROCEDURE — 87205 SMEAR GRAM STAIN: CPT

## 2022-07-05 PROCEDURE — 85379 FIBRIN DEGRADATION QUANT: CPT

## 2022-07-05 PROCEDURE — 87636 SARSCOV2 & INF A&B AMP PRB: CPT

## 2022-07-05 PROCEDURE — 80053 COMPREHEN METABOLIC PANEL: CPT

## 2022-07-05 PROCEDURE — 6360000004 HC RX CONTRAST MEDICATION: Performed by: EMERGENCY MEDICINE

## 2022-07-05 PROCEDURE — 99285 EMERGENCY DEPT VISIT HI MDM: CPT

## 2022-07-05 PROCEDURE — 94640 AIRWAY INHALATION TREATMENT: CPT

## 2022-07-05 PROCEDURE — 71260 CT THORAX DX C+: CPT | Performed by: EMERGENCY MEDICINE

## 2022-07-05 PROCEDURE — 36415 COLL VENOUS BLD VENIPUNCTURE: CPT

## 2022-07-05 PROCEDURE — 2580000003 HC RX 258: Performed by: EMERGENCY MEDICINE

## 2022-07-05 PROCEDURE — 87070 CULTURE OTHR SPECIMN AEROBIC: CPT

## 2022-07-05 RX ORDER — PREDNISONE 20 MG/1
40 TABLET ORAL ONCE
Status: COMPLETED | OUTPATIENT
Start: 2022-07-05 | End: 2022-07-05

## 2022-07-05 RX ORDER — SODIUM CHLORIDE 0.9 % (FLUSH) 0.9 %
10 SYRINGE (ML) INJECTION PRN
Status: DISCONTINUED | OUTPATIENT
Start: 2022-07-05 | End: 2022-07-05 | Stop reason: HOSPADM

## 2022-07-05 RX ORDER — PREDNISONE 10 MG/1
TABLET ORAL
Qty: 16 TABLET | Refills: 0 | Status: SHIPPED | OUTPATIENT
Start: 2022-07-06 | End: 2022-07-09

## 2022-07-05 RX ORDER — 0.9 % SODIUM CHLORIDE 0.9 %
80 INTRAVENOUS SOLUTION INTRAVENOUS ONCE
Status: COMPLETED | OUTPATIENT
Start: 2022-07-05 | End: 2022-07-05

## 2022-07-05 RX ORDER — IPRATROPIUM BROMIDE AND ALBUTEROL SULFATE 2.5; .5 MG/3ML; MG/3ML
1 SOLUTION RESPIRATORY (INHALATION) ONCE
Status: COMPLETED | OUTPATIENT
Start: 2022-07-05 | End: 2022-07-05

## 2022-07-05 RX ORDER — ALBUTEROL SULFATE 90 UG/1
2 AEROSOL, METERED RESPIRATORY (INHALATION) 4 TIMES DAILY PRN
Qty: 18 G | Refills: 0 | Status: SHIPPED | OUTPATIENT
Start: 2022-07-05

## 2022-07-05 RX ADMIN — IPRATROPIUM BROMIDE AND ALBUTEROL SULFATE 1 AMPULE: .5; 2.5 SOLUTION RESPIRATORY (INHALATION) at 09:51

## 2022-07-05 RX ADMIN — PREDNISONE 40 MG: 20 TABLET ORAL at 09:30

## 2022-07-05 RX ADMIN — SODIUM CHLORIDE, PRESERVATIVE FREE 10 ML: 5 INJECTION INTRAVENOUS at 11:06

## 2022-07-05 RX ADMIN — IOPAMIDOL 75 ML: 755 INJECTION, SOLUTION INTRAVENOUS at 11:06

## 2022-07-05 RX ADMIN — SODIUM CHLORIDE 80 ML: 9 INJECTION, SOLUTION INTRAVENOUS at 11:00

## 2022-07-05 ASSESSMENT — ENCOUNTER SYMPTOMS
CONSTIPATION: 1
VOMITING: 1
SHORTNESS OF BREATH: 1
RHINORRHEA: 1
COUGH: 1
DIARRHEA: 1

## 2022-07-05 ASSESSMENT — PAIN - FUNCTIONAL ASSESSMENT: PAIN_FUNCTIONAL_ASSESSMENT: NONE - DENIES PAIN

## 2022-07-05 NOTE — LETTER
[address]  [address] [facility] [address]  Phone: [phone]               July 5, 2022    Patient: Becky Valenzuela   YOB: 1975   Date of Visit: 7/5/2022       To Whom It May Concern:    Becky Valenzuela was seen and treated in our emergency department on 7/5/2022. He may return to work on 07/6/22.       Sincerely,       Shirley Villalpando RN         Signature:__________________________________

## 2022-07-05 NOTE — ED PROVIDER NOTES
Laterality Date    INGUINAL HERNIA REPAIR Left     LEG SURGERY Left     compartment syndrome left leg       CURRENT MEDICATIONS     Suboxone  Lisonpril  Sertraline  Risperidone  Hydroxyzine. Discharge Medication List as of 7/5/2022 12:26 PM      CONTINUE these medications which have NOT CHANGED    Details   lisinopril (PRINIVIL;ZESTRIL) 20 MG tablet Take 20 mg by mouth dailyHistorical Med      buprenorphine-naloxone (SUBOXONE) 8-2 MG FILM SL film Suboxone 8 mg-2 mg sublingual filmHistorical Med      clonazePAM (KLONOPIN) 1 MG tablet Take 1 mg by mouth 2 times daily      sertraline (ZOLOFT) 25 MG tablet Take 25 mg by mouth daily      risperiDONE (RISPERDAL) 1 MG tablet Take 1 mg by mouth nightly      Neomycin-Polymyxin-Dexameth 0.1 % OINT Apply 0.5 inches to eye 4 times daily, Disp-1 Tube, R-1      oxyCODONE-acetaminophen (PERCOCET) 5-325 MG per tablet Take 1-2 tablets by mouth every 6 hours as needed for Pain, Disp-20 tablet, R-0      ibuprofen (ADVIL;MOTRIN) 800 MG tablet Take 1 tablet by mouth every 8 hours as needed for Pain., Disp-30 tablet, R-0             ALLERGIES     is allergic to prednisone. FAMILY HISTORY            SOCIAL HISTORY      reports that he has quit smoking. He uses smokeless tobacco. He reports current drug use. Drugs: Marijuana (Weed) and Cocaine. He reports that he does not drink alcohol.     PHYSICAL EXAM     INITIAL VITALS: BP (!) 158/132   Pulse 69   Temp 98.1 °F (36.7 °C) (Oral)   Resp 20   SpO2 100%   Gen: NAD  Head: Normocephalic, atraumatic  Eye: Pupils equal round reactive to light, no conjunctivitis  ENT: MMM  Neck: No adenopathy or JVD  Heart: Regular rate and rhythm no murmurs  Lungs: Expiratory wheezing and prolonged expiration bilaterally, no respiratory distress  Chest wall: No crepitus, no tenderness palpation  Abdomen: Soft, nontender, nondistended, with no peritoneal signs  Neurologic: Patient is alert and oriented x3, motor and sensation is intact in all 4 extremities, fluent speech  Extremities: No edema, no calf tenderness to palpation    MEDICAL DECISION MAKING:     MDM and Emergency Department course  48 y.o. male presenting with clinical signs of a COPD exacerbation. He has not been formally diagnosed with COPD. We ruled him out for COVID and influenza. There is no anemia accounting for shortness of breath. BNP is not elevated, doubt CHF. D-dimer was mildly elevated and so we did get a CT scan to rule out a pulmonary embolism. We will start him on steroids and albuterol treatments. I think that he likely has a bronchitis/COPD. D/w pt the results, treatment plan, warning precautions for prompt ED return and importance of close OP FU, he verbalizes understanding and agrees with the treatment plan. DIAGNOSTIC RESULTS     RADIOLOGY:All plain film, CT, MRI, and formal ultrasound images (except ED bedside ultrasound) are read by the radiologist and the images and interpretations are directly viewed by the emergency physician. CT CHEST PULMONARY EMBOLISM W CONTRAST   Final Result   No evidence of pulmonary embolism or acute pulmonary abnormality. Mild mostly lower lobe bronchitis. Mild bibasilar dependent/atelectatic   changes. Prior granulomatous disease. Right upper tracheocele      RECOMMENDATIONS:   Unavailable         XR CHEST (2 VW)   Final Result   Senescent changes. Questionable COPD. No evidence of acute cardiopulmonary process. LABS: All lab results were reviewed by myself, and all abnormals are listed below.   Labs Reviewed   CULTURE, RESPIRATORY - Abnormal; Notable for the following components:       Result Value    Direct Exam MIXED BACTERIAL MORPHOTYPES SEEN ON GRAM STAIN. (*)     All other components within normal limits   CBC WITH AUTO DIFFERENTIAL - Abnormal; Notable for the following components:    Monocytes 9 (*)     Eosinophils % 9 (*)     Absolute Eos # 0.50 (*)     All other components within normal limits   COMPREHENSIVE METABOLIC PANEL - Abnormal; Notable for the following components:    Glucose 113 (*)     ALT 52 (*)     AST 50 (*)     All other components within normal limits   D-DIMER, QUANTITATIVE - Abnormal; Notable for the following components:    D-Dimer, Quant 0.83 (*)     All other components within normal limits   COVID-19 & INFLUENZA COMBO   TROPONIN   TROPONIN   BRAIN NATRIURETIC PEPTIDE       EMERGENCY DEPARTMENT COURSE:   Vitals:    Vitals:    07/05/22 0841 07/05/22 0844 07/05/22 0951   BP:  (!) 158/132    Pulse: 83  69   Resp: 18  20   Temp: 98.1 °F (36.7 °C)     TempSrc: Oral     SpO2: 95%  100%       The patient was given the following medications while in the emergency department:  Orders Placed This Encounter   Medications    ipratropium-albuterol (DUONEB) nebulizer solution 1 ampule     Order Specific Question:   Initiate RT Bronchodilator Protocol     Answer: Yes    predniSONE (DELTASONE) tablet 40 mg    iopamidol (ISOVUE-370) 76 % injection 75 mL    DISCONTD: sodium chloride flush 0.9 % injection 10 mL    0.9 % sodium chloride bolus    predniSONE (DELTASONE) 10 MG tablet     Sig: Take 4 tablets by mouth once daily for 4 days     Dispense:  16 tablet     Refill:  0    albuterol sulfate HFA (VENTOLIN HFA) 108 (90 Base) MCG/ACT inhaler     Sig: Inhale 2 puffs into the lungs 4 times daily as needed for Wheezing     Dispense:  18 g     Refill:  0     -------------------------  CRITICAL CARE:   CONSULTS: None  PROCEDURES: Procedures     FINAL IMPRESSION      1.  Bronchitis          DISPOSITION/PLAN   DISPOSITION Decision To Discharge 07/05/2022 12:24:07 PM      PATIENT REFERRED TO:  QUINTON EMMANUEL [facility]  [address] [facility]  [address] [facility] [address]  [address]   [address] Vo[address]  [address] [place] [address] [phone]    If symptoms worsen      DISCHARGE MEDICATIONS:  Discharge Medication List as of 7/5/2022 12:26 PM      START taking these medications    Details predniSONE (DELTASONE) 10 MG tablet Take 4 tablets by mouth once daily for 4 days, Disp-16 tablet, R-0Normal      albuterol sulfate HFA (VENTOLIN HFA) 108 (90 Base) MCG/ACT inhaler Inhale 2 puffs into the lungs 4 times daily as needed for Wheezing, Disp-18 g, R-0Normal               Reynaldo Alvarez MD  Attending Emergency Physician                      Reynaldo Alvarez MD  07/05/22 1699

## 2022-07-05 NOTE — ED TRIAGE NOTES
Mode of arrival (squad #, walk in, police, etc) : walk in        Chief complaint(s): SOB        Arrival Note (brief scenario, treatment PTA, etc). 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 Pt states he got two doses from his suboxone doc and one from the \"streets\". Pt states he tried an inhaler at home with minimal relief. C= \"Have you ever felt that you should Cut down on your drinking? \"  No  A= \"Have people Annoyed you by criticizing your drinking? \"  No  G= \"Have you ever felt bad or Guilty about your drinking? \"  No  E= \"Have you ever had a drink as an Eye-opener first thing in the morning to steady your nerves or to help a hangover? \"  No      Deferred []      Reason for deferring: N/A    *If yes to two or more: probable alcohol abuse. *

## 2022-07-06 LAB
CULTURE: ABNORMAL
DIRECT EXAM: ABNORMAL
SPECIMEN DESCRIPTION: ABNORMAL